# Patient Record
(demographics unavailable — no encounter records)

---

## 2024-11-13 NOTE — CONSULT LETTER
[Dear  ___] : Dear  [unfilled], [Courtesy Letter:] : I had the pleasure of seeing your patient, [unfilled], in my office today. [This report is provisional, pending the completion of the evaluation.  A final diagnosis and plan will follow.] : This report is provisional, pending the completion of the evaluation.  A final diagnosis and plan will follow. [Consult Closing:] : Thank you very much for allowing me to participate in the care of this patient.  If you have any questions, please do not hesitate to contact me. [Sincerely,] : Sincerely, [FreeTextEntry3] : Rachael Sawant MD  ___________________________________ Fellow, Division of Allergy and Immunology  [facility] [employer] at \[facility]\""/[address], [place] [address] Tel: [phone] Fax: [phone] Email: [email or web address]

## 2024-11-13 NOTE — PLAN
[FreeTextEntry1] : Patient passed oat challenge and tolerated 1/4 dry cup oatmeal (20g protein). She can incorporate oat into her diet regularly.   Mom also states that Dione has cough with illness, more so then her other children. No night time cough or trouble with exercise. Will continue to monitor for now. Can consider PRN albuterol in the future if needed.   Family to set up peanut challenge next.

## 2024-11-13 NOTE — HISTORY OF PRESENT ILLNESS
[] : The following medications are to be available during the challenge procedure: [Diphenhydramine] : Diphenhydramine, 1mg/kg PO (12.5 mg/5 cc) [Epinephrine 1:1000 IM] : Epinephrine 1:1000 IM, 0.01cc/kg (max dose 0.5 cc) [___] : HR: [unfilled]  [_______] : Time: [unfilled] [Clear] : Skin Findings: Clear [No] : Reaction: No [____] : IVB: [unfilled] [___] : Amount: [unfilled] [___% 1) Skin -  A) Erythematous rash - % area involved] : Erythematous Rash (IA): [unfilled] % area involved [0 Pruritus: 0  - absent] : Pruritus (IB): 0 - absent [0 Urticaria/Angioedema: 0 - Absent] : Urticaria/Angioedema (IC): 0  - Absent [0 Rash: 0 - Absent] : Rash (ID): 0 - Absent [0 Sneezing/Itchin - Absent] : Sneezing/Itching (IIA): 0 - Absent [0 Nasal congestion: 0 - Absent] : Nasal congestion (IIB): 0 - Absent [0 Rhinorrhea: 0 - Absent] : Rhinorrhea (IIC): 0 - Absent [0 Laryngeal: 0 - Absent] : Laryngeal (IID): 0 - Absent [0 Wheezin - Absent] : Wheezing (IIIA): 0 - Absent [0 Gastro-Subjective complaints: 0 - Absent] : Gastro-Subjective Complaints (KRISTEN): 0 - Absent [0 Gastro-Objective complaints: 0 - Absent] : Gastro-Objective Complaints (IVB): 0 - Absent [Antihistamine use in past 5 days] : No antihistamine use in past 5 days [Recent Illness] : no recent illness [Fever] : no fever [Asthma] : no asthma [Asthma well controlled?] : asthma well controlled: no [FreeTextEntry1] : oatmeal [FreeTextEntry2] : 20g [FreeTextEntry3] : O2 99% [FreeTextEntry4] : O2 99% [FreeTextEntry5] : O2 99% [FreeTextEntry6] : O2 99% [de-identified] : Challenge complete

## 2024-11-13 NOTE — CONSULT LETTER
[Dear  ___] : Dear  [unfilled], [Courtesy Letter:] : I had the pleasure of seeing your patient, [unfilled], in my office today. [This report is provisional, pending the completion of the evaluation.  A final diagnosis and plan will follow.] : This report is provisional, pending the completion of the evaluation.  A final diagnosis and plan will follow. [Consult Closing:] : Thank you very much for allowing me to participate in the care of this patient.  If you have any questions, please do not hesitate to contact me. [Sincerely,] : Sincerely, [FreeTextEntry3] : Rachael Sawant MD  ___________________________________ Fellow, Division of Allergy and Immunology  [facility] at [facility]/[address], [place] [address] Tel: [phone] Fax: [phone] Email: [email or web address]

## 2024-11-13 NOTE — HISTORY OF PRESENT ILLNESS
[] : The following medications are to be available during the challenge procedure: [Diphenhydramine] : Diphenhydramine, 1mg/kg PO (12.5 mg/5 cc) [Epinephrine 1:1000 IM] : Epinephrine 1:1000 IM, 0.01cc/kg (max dose 0.5 cc) [___] : HR: [unfilled]  [_______] : Time: [unfilled] [Clear] : Skin Findings: Clear [No] : Reaction: No [____] : IVB: [unfilled] [___] : Amount: [unfilled] [___% 1) Skin -  A) Erythematous rash - % area involved] : Erythematous Rash (IA): [unfilled] % area involved [0 Pruritus: 0  - absent] : Pruritus (IB): 0 - absent [0 Urticaria/Angioedema: 0 - Absent] : Urticaria/Angioedema (IC): 0  - Absent [0 Rash: 0 - Absent] : Rash (ID): 0 - Absent [0 Sneezing/Itchin - Absent] : Sneezing/Itching (IIA): 0 - Absent [0 Nasal congestion: 0 - Absent] : Nasal congestion (IIB): 0 - Absent [0 Rhinorrhea: 0 - Absent] : Rhinorrhea (IIC): 0 - Absent [0 Laryngeal: 0 - Absent] : Laryngeal (IID): 0 - Absent [0 Wheezin - Absent] : Wheezing (IIIA): 0 - Absent [0 Gastro-Subjective complaints: 0 - Absent] : Gastro-Subjective Complaints (KRISTEN): 0 - Absent [0 Gastro-Objective complaints: 0 - Absent] : Gastro-Objective Complaints (IVB): 0 - Absent [Antihistamine use in past 5 days] : No antihistamine use in past 5 days [Recent Illness] : no recent illness [Fever] : no fever [Asthma] : no asthma [Asthma well controlled?] : asthma well controlled: no [FreeTextEntry1] : oatmeal [FreeTextEntry2] : 20g [FreeTextEntry3] : O2 99% [FreeTextEntry4] : O2 99% [FreeTextEntry5] : O2 99% [FreeTextEntry6] : O2 99% [de-identified] : Challenge complete

## 2024-11-22 NOTE — CONSULT LETTER
[Dear  ___] : Dear  [unfilled], [Courtesy Letter:] : I had the pleasure of seeing your patient, [unfilled], in my office today. [Please see my note below.] : Please see my note below. [Consult Closing:] : Thank you very much for allowing me to participate in the care of this patient.  If you have any questions, please do not hesitate to contact me. [Sincerely,] : Sincerely, [FreeTextEntry3] : Rachael Sawant MD  ___________________________________ Fellow, Division of Allergy and Immunology  [facility] [employer] at [facility]/[address], [place] [address] Tel: [phone] Fax: [phone] Email: [email or web address]

## 2024-11-22 NOTE — HISTORY OF PRESENT ILLNESS
[Diphenhydramine] : Diphenhydramine, 1mg/kg PO (12.5 mg/5 cc) [Epinephrine 1:1000 IM] : Epinephrine 1:1000 IM, 0.01cc/kg (max dose 0.5 cc) [___] : HR: [unfilled]  [_______] : Time: [unfilled] [Clear] : Skin Findings: Clear [No] : Reaction: No [___] : Amount: [unfilled] [___% 1) Skin -  A) Erythematous rash - % area involved] : Erythematous Rash (IA): [unfilled] % area involved [0 Pruritus: 0  - absent] : Pruritus (IB): 0 - absent [0 Urticaria/Angioedema: 0 - Absent] : Urticaria/Angioedema (IC): 0  - Absent [0 Rash: 0 - Absent] : Rash (ID): 0 - Absent [0 Sneezing/Itchin - Absent] : Sneezing/Itching (IIA): 0 - Absent [0 Nasal congestion: 0 - Absent] : Nasal congestion (IIB): 0 - Absent [0 Rhinorrhea: 0 - Absent] : Rhinorrhea (IIC): 0 - Absent [0 Laryngeal: 0 - Absent] : Laryngeal (IID): 0 - Absent [0 Wheezin - Absent] : Wheezing (IIIA): 0 - Absent [0 Gastro-Subjective complaints: 0 - Absent] : Gastro-Subjective Complaints (KRISTEN): 0 - Absent [0 Gastro-Objective complaints: 0 - Absent] : Gastro-Objective Complaints (IVB): 0 - Absent [Antihistamine use in past 5 days] : No antihistamine use in past 5 days [Recent Illness] : no recent illness [Fever] : no fever [Asthma] : no asthma [Asthma well controlled?] : asthma well controlled: no [FreeTextEntry1] : peanut powder [FreeTextEntry2] : 12g [FreeTextEntry4] : O2 99% [FreeTextEntry5] : O2 98% [FreeTextEntry6] : O2 99%

## 2024-11-22 NOTE — PLAN
[FreeTextEntry1] : Patient has passed PEANUT and OAT challenges thus far. Plan for WALNUT/PECAN challenge next.

## 2024-11-22 NOTE — CONSULT LETTER
[Dear  ___] : Dear  [unfilled], [Courtesy Letter:] : I had the pleasure of seeing your patient, [unfilled], in my office today. [Please see my note below.] : Please see my note below. [Consult Closing:] : Thank you very much for allowing me to participate in the care of this patient.  If you have any questions, please do not hesitate to contact me. [Sincerely,] : Sincerely, [FreeTextEntry3] : Rachael Sawant MD  ___________________________________ Fellow, Division of Allergy and Immunology  [facility] at [facility]/[address], [place] [address] Tel: [phone] Fax: [phone] Email: [email or web address]

## 2024-11-22 NOTE — END OF VISIT
[] : Fellow [FreeTextEntry3] : Patient discharged one hour after last ingestion, no signs or symptoms of IgE mediated reactivity. Instructed to observe at home for the next several hours and call/seek emergency care if needed.

## 2024-12-18 NOTE — HISTORY OF PRESENT ILLNESS
[Consent obtained and signed form scanned in to chart] : Consent obtained and signed form scanned in to chart [] : The following medications are to be available during the challenge procedure: [Diphenhydramine] : Diphenhydramine, 1-2mg/kg IM (max dose 50mg), (50mg/1 cc) [Epinephrine 1:1000 IM] : Epinephrine 1:1000 IM, 0.01cc/kg (max dose 0.5 cc) [___] : HR: [unfilled]  [_______] : Time: [unfilled] [Clear] : Skin Findings: Clear [No] : Reaction: No [___] : Amount: [unfilled] [0 Pruritus: 0  - absent] : Pruritus (IB): 0 - absent [0 Urticaria/Angioedema: 0 - Absent] : Urticaria/Angioedema (IC): 0  - Absent [0 Rash: 0 - Absent] : Rash (ID): 0 - Absent [0 Sneezing/Itchin - Absent] : Sneezing/Itching (IIA): 0 - Absent [0 Nasal congestion: 0 - Absent] : Nasal congestion (IIB): 0 - Absent [0 Rhinorrhea: 0 - Absent] : Rhinorrhea (IIC): 0 - Absent [0 Laryngeal: 0 - Absent] : Laryngeal (IID): 0 - Absent [0 Wheezin - Absent] : Wheezing (IIIA): 0 - Absent [0 Gastro-Subjective complaints: 0 - Absent] : Gastro-Subjective Complaints (KRISTEN): 0 - Absent [0 Gastro-Objective complaints: 0 - Absent] : Gastro-Objective Complaints (IVB): 0 - Absent [Antihistamine use in past 5 days] : No antihistamine use in past 5 days [Recent Illness] : no recent illness [Fever] : no fever [Asthma] : no asthma [Asthma well controlled?] : asthma well controlled: no [FreeTextEntry1] : walnut [FreeTextEntry2] : 16g [FreeTextEntry3] : O2 97% [FreeTextEntry4] : O2 95% [FreeTextEntry5] : O2 99% [FreeTextEntry6] : O2 99%

## 2024-12-18 NOTE — PROCEDURE
[Patient ingested ___ amount of allergen] : Patient ingested [unfilled] amount of allergen [Pass] : Challenge: Pass [FreeTextEntry1] : Patient has passed WALNUT, PEANUT and OAT challenges thus far. Can incorporate Pecan into diet at home.   Return in one month to discuss next steps- OIT vs other food challenges (will be higher risk).

## 2024-12-18 NOTE — REVIEW OF SYSTEMS
[Atopic Dermatitis] : atopic dermatitis [Nl] : Gastrointestinal [de-identified] : Eczema on cheek [de-identified] : see HPI

## 2024-12-18 NOTE — REVIEW OF SYSTEMS
[Atopic Dermatitis] : atopic dermatitis [Nl] : Gastrointestinal [de-identified] : Eczema on cheek [de-identified] : see HPI

## 2024-12-18 NOTE — PHYSICAL EXAM
[Alert] : alert [Well Nourished] : well nourished [No Acute Distress] : no acute distress [Well Developed] : well developed [Normal Pupil & Iris Size/Symmetry] : normal pupil and iris size and symmetry [No Discharge] : no discharge [Sclera Not Icteric] : sclera not icteric [Normal Lips/Tongue] : the lips and tongue were normal [Normal Outer Ear/Nose] : the ears and nose were normal in appearance [Normal Tonsils] : normal tonsils [No Thrush] : no thrush [Normal Rate and Effort] : normal respiratory rhythm and effort [No Crackles] : no crackles [No Retractions] : no retractions [Bilateral Audible Breath Sounds] : bilateral audible breath sounds [Normal Rate] : heart rate was normal  [Normal S1, S2] : normal S1 and S2 [Regular Rhythm] : with a regular rhythm [Soft] : abdomen soft [Not Distended] : not distended [Patches] : ~M patches present [Clear Rhinorrhea] : no clear rhinorrhea was seen [Wheezing] : no wheezing was heard

## 2025-01-08 NOTE — REASON FOR VISIT
[Routine Follow-Up] : a routine follow-up visit for [Parents] : parents [Medical Records] : medical records [To Food] : allergy to food [Eczema] : eczema

## 2025-01-10 NOTE — PHYSICAL EXAM
[Alert] : alert [Well Nourished] : well nourished [Healthy Appearance] : healthy appearance [No Acute Distress] : no acute distress [Well Developed] : well developed [Normal Pupil & Iris Size/Symmetry] : normal pupil and iris size and symmetry [No Discharge] : no discharge [No Photophobia] : no photophobia [Sclera Not Icteric] : sclera not icteric [Normal Lips/Tongue] : the lips and tongue were normal [Normal Outer Ear/Nose] : the ears and nose were normal in appearance [Normal Tonsils] : normal tonsils [No Thrush] : no thrush [Pale mucosa] : pale mucosa [Supple] : the neck was supple [Normal Rate and Effort] : normal respiratory rhythm and effort [No Crackles] : no crackles [No Retractions] : no retractions [Bilateral Audible Breath Sounds] : bilateral audible breath sounds [Normal Rate] : heart rate was normal  [Normal S1, S2] : normal S1 and S2 [No murmur] : no murmur [Regular Rhythm] : with a regular rhythm [Soft] : abdomen soft [Not Tender] : non-tender [Not Distended] : not distended [Skin Intact] : skin intact  [No Rash] : no rash [No Skin Lesions] : no skin lesions [Normal Mood] : mood was normal [Normal Affect] : affect was normal [Alert, Awake, Oriented as Age-Appropriate] : alert, awake, oriented as age appropriate [Wheezing] : no wheezing was heard [de-identified] : Stye on lower eyelid of left eye

## 2025-01-10 NOTE — REVIEW OF SYSTEMS
[Nl] : Gastrointestinal [FreeTextEntry4] : see HPI [de-identified] : see HPI [de-identified] : see HPI

## 2025-01-10 NOTE — HISTORY OF PRESENT ILLNESS
[de-identified] : 3 year old female with food allergies (milk, egg, TN (not walnut)) and history of eczema presents for follow up visit.  Last follow up visit Aug. 21, 2024.   Interval Hx: Since her last follow up visit, she has passed challenges to walnut, oat, and peanut.  Eating walnut 2-3x/week, oatmeal 2-3x/week, peanut 2-3x/week. Family has not yet had a chance to introduce pecan at home but they are planning too.  Eczema is well controlled with Aquaphor daily. Developed small stye on left eye yesterday morning. Older sister also had one. Mom has been doing warm compresses.   Previous Hx:  AVOIDS: - MILK: Hx of reaction at 3 yo- rash, vomiting, wheeze. + casein IgE (class 3), + CM IgE (class 3), casein SPT 10x3, CM 10x5 -EGG: Hx of reaction at 6 mo- rash, vomiting, cough. + ovalbumin (class 3) and ovmucoid (class 4), egg white total class 4, SPT 15x15  --TREE NUTS: has never tried. Elevated immunocaps to: [place] (class 3), neg SPT Cashew (+ components class 2), SPT 10x2 Pistachio class 3, SPT 12x12 [place] class 0/1, neg components Pecan class 0, neg components, SPT 2x2 Radha nut (+ components cor A9 class 3), SPT 5x5 [place] nut class 2, neg components, SPT 10x3  OAT - oat meal around 1 year old- had rash but family does not really remember, currently avoids. Slightly elevated immunocaps (class 1)  PEANUT- has not tried. Slightly elevated immunocaps 0.78 (class 2, neg components)

## 2025-01-10 NOTE — PHYSICAL EXAM
[Alert] : alert [Well Nourished] : well nourished [Healthy Appearance] : healthy appearance [No Acute Distress] : no acute distress [Well Developed] : well developed [Normal Pupil & Iris Size/Symmetry] : normal pupil and iris size and symmetry [No Discharge] : no discharge [No Photophobia] : no photophobia [Sclera Not Icteric] : sclera not icteric [Normal Lips/Tongue] : the lips and tongue were normal [Normal Outer Ear/Nose] : the ears and nose were normal in appearance [Normal Tonsils] : normal tonsils [No Thrush] : no thrush [Pale mucosa] : pale mucosa [Supple] : the neck was supple [Normal Rate and Effort] : normal respiratory rhythm and effort [No Crackles] : no crackles [No Retractions] : no retractions [Bilateral Audible Breath Sounds] : bilateral audible breath sounds [Normal Rate] : heart rate was normal  [Normal S1, S2] : normal S1 and S2 [No murmur] : no murmur [Regular Rhythm] : with a regular rhythm [Soft] : abdomen soft [Not Tender] : non-tender [Not Distended] : not distended [Skin Intact] : skin intact  [No Rash] : no rash [No Skin Lesions] : no skin lesions [Normal Mood] : mood was normal [Normal Affect] : affect was normal [Alert, Awake, Oriented as Age-Appropriate] : alert, awake, oriented as age appropriate [Wheezing] : no wheezing was heard [de-identified] : Stye on lower eyelid of left eye

## 2025-01-10 NOTE — REVIEW OF SYSTEMS
[Nl] : Gastrointestinal [FreeTextEntry4] : see HPI [de-identified] : see HPI [de-identified] : see HPI

## 2025-01-10 NOTE — HISTORY OF PRESENT ILLNESS
[de-identified] : 3 year old female with food allergies (milk, egg, TN (not walnut)) and history of eczema presents for follow up visit.  Last follow up visit Aug. 21, 2024.   Interval Hx: Since her last follow up visit, she has passed challenges to walnut, oat, and peanut.  Eating walnut 2-3x/week, oatmeal 2-3x/week, peanut 2-3x/week. Family has not yet had a chance to introduce pecan at home but they are planning too.  Eczema is well controlled with Aquaphor daily. Developed small stye on left eye yesterday morning. Older sister also had one. Mom has been doing warm compresses.   Previous Hx:  AVOIDS: - MILK: Hx of reaction at 1 yo- rash, vomiting, wheeze. + casein IgE (class 3), + CM IgE (class 3), casein SPT 10x3, CM 10x5 -EGG: Hx of reaction at 6 mo- rash, vomiting, cough. + ovalbumin (class 3) and ovmucoid (class 4), egg white total class 4, SPT 15x15  --TREE NUTS: has never tried. Elevated immunocaps to: [place] (class 3), neg SPT Cashew (+ components class 2), SPT 10x2 [place] class 3, SPT 12x12 [place] class 0/1, neg components Pecan class 0, neg components, SPT 2x2 Radha nut (+ components cor A9 class 3), SPT 5x5 [place] nut class 2, neg components, SPT 10x3  OAT - oat meal around 1 year old- had rash but family does not really remember, currently avoids. Slightly elevated immunocaps (class 1)  PEANUT- has not tried. Slightly elevated immunocaps 0.78 (class 2, neg components)

## 2025-02-26 NOTE — PHYSICAL EXAM
[Alert] : alert [Well Nourished] : well nourished [Healthy Appearance] : healthy appearance [No Acute Distress] : no acute distress [Well Developed] : well developed [Normal Pupil & Iris Size/Symmetry] : normal pupil and iris size and symmetry [No Discharge] : no discharge [No Photophobia] : no photophobia [Sclera Not Icteric] : sclera not icteric [Normal TMs] : both tympanic membranes were normal [Normal Nasal Mucosa] : the nasal mucosa was normal [Normal Lips/Tongue] : the lips and tongue were normal [Normal Outer Ear/Nose] : the ears and nose were normal in appearance [Normal Tonsils] : normal tonsils [No Thrush] : no thrush [Supple] : the neck was supple [Normal Rate and Effort] : normal respiratory rhythm and effort [No Crackles] : no crackles [No Retractions] : no retractions [Bilateral Audible Breath Sounds] : bilateral audible breath sounds [Normal Rate] : heart rate was normal  [Normal S1, S2] : normal S1 and S2 [No murmur] : no murmur [Regular Rhythm] : with a regular rhythm [Soft] : abdomen soft [Not Tender] : non-tender [Not Distended] : not distended [No HSM] : no hepato-splenomegaly [Normal Cervical Lymph Nodes] : cervical [Patches] : ~M patches present [Xerosis] : xerosis [Excoriated] : excoriated [Lichenifcation] : lichenifcation [No clubbing] : no clubbing [No Edema] : no edema [No Cyanosis] : no cyanosis [Normal Mood] : mood was normal [Normal Affect] : affect was normal [Alert, Awake, Oriented as Age-Appropriate] : alert, awake, oriented as age appropriate

## 2025-02-26 NOTE — REASON FOR VISIT
[Routine Follow-Up] : a routine follow-up visit for [To Food] : allergy to food [Eczema] : eczema [Parents] : parents [Medical Records] : medical records

## 2025-03-03 NOTE — HISTORY OF PRESENT ILLNESS
[] : The following medications are to be available during the challenge procedure: [Diphenhydramine] : Diphenhydramine, 1-2mg/kg IM (max dose 50mg), (50mg/1 cc) [Epinephrine 1:1000 IM] : Epinephrine 1:1000 IM, 0.01cc/kg (max dose 0.5 cc) [Albuterol MDI] : Albuterol MDI, 2 - 4 puffs [Albuterol nebulized] : Albuterol nebulized, 0.083% [de-identified] : 3 year old female with food allergies (milk, egg, TN (not walnut)) and history of eczema presents for follow up visit.  Initially presented for almond challenge however skin testing placed prior to challenge showed a wheal of 7x3 ( no wheal in July 2024).  Did not proceed with challenge.   Also currently with eczema flare. Ran out of mometasone so has not been using.   Had flu 2 weeks ago [Antihistamine use in past 5 days] : No antihistamine use in past 5 days [Recent Illness] : no recent illness [Fever] : no fever [Asthma] : no asthma [Asthma well controlled?] : asthma well controlled: no [FreeTextEntry1] : almond butter [FreeTextEntry2] : 2 tablespoons

## 2025-03-03 NOTE — HISTORY OF PRESENT ILLNESS
[] : The following medications are to be available during the challenge procedure: [Diphenhydramine] : Diphenhydramine, 1-2mg/kg IM (max dose 50mg), (50mg/1 cc) [Epinephrine 1:1000 IM] : Epinephrine 1:1000 IM, 0.01cc/kg (max dose 0.5 cc) [Albuterol MDI] : Albuterol MDI, 2 - 4 puffs [Albuterol nebulized] : Albuterol nebulized, 0.083% [de-identified] : 3 year old female with food allergies (milk, egg, TN (not walnut)) and history of eczema presents for follow up visit.  Initially presented for almond challenge however skin testing placed prior to challenge showed a wheal of 7x3 ( no wheal in July 2024).  Did not proceed with challenge.   Also currently with eczema flare. Ran out of mometasone so has not been using.   Had flu 2 weeks ago [Antihistamine use in past 5 days] : No antihistamine use in past 5 days [Recent Illness] : no recent illness [Fever] : no fever [Asthma] : no asthma [Asthma well controlled?] : asthma well controlled: no [FreeTextEntry1] : almond butter [FreeTextEntry2] : 2 tablespoons

## 2025-03-03 NOTE — HISTORY OF PRESENT ILLNESS
[] : The following medications are to be available during the challenge procedure: [Diphenhydramine] : Diphenhydramine, 1-2mg/kg IM (max dose 50mg), (50mg/1 cc) [Epinephrine 1:1000 IM] : Epinephrine 1:1000 IM, 0.01cc/kg (max dose 0.5 cc) [Albuterol MDI] : Albuterol MDI, 2 - 4 puffs [Albuterol nebulized] : Albuterol nebulized, 0.083% [de-identified] : 3 year old female with food allergies (milk, egg, TN (not walnut)) and history of eczema presents for follow up visit.  Initially presented for almond challenge however skin testing placed prior to challenge showed a wheal of 7x3 ( no wheal in July 2024).  Did not proceed with challenge.   Also currently with eczema flare. Ran out of mometasone so has not been using.   Had flu 2 weeks ago [Antihistamine use in past 5 days] : No antihistamine use in past 5 days [Recent Illness] : no recent illness [Fever] : no fever [Asthma] : no asthma [Asthma well controlled?] : asthma well controlled: no [FreeTextEntry1] : almond butter [FreeTextEntry2] : 2 tablespoons

## 2025-03-14 NOTE — PROCEDURE
[Patient ingested ___ amount of allergen] : Patient ingested [unfilled] amount of allergen [Pass] : Challenge: Pass [FreeTextEntry1] : Patient ingested 10g of almond powder, 5g of peanut protein.

## 2025-03-14 NOTE — PLAN
[FreeTextEntry1] : Patient has passed WALNUT, PEANUT, OAT, ALMOND challenges thus far. Can incorporate Pecan into diet at home.  Make follow up appointment to discuss next steps and other possible food challenges.  Consider cashew challenge as next food challenge

## 2025-03-14 NOTE — HISTORY OF PRESENT ILLNESS
[Consent obtained and signed form scanned in to chart] : Consent obtained and signed form scanned in to chart [] : The following medications are to be available during the challenge procedure: [Diphenhydramine] : Diphenhydramine, 1-2mg/kg IM (max dose 50mg), (50mg/1 cc) [___ mg] : Dose: [unfilled] mg [Epinephrine 1:1000 IM] : Epinephrine 1:1000 IM, 0.01cc/kg (max dose 0.5 cc) [___ cc] : Volume: [unfilled] cc [___] : HR: [unfilled]  [_______] : Time: [unfilled] [Clear] : Skin Findings: Clear [No] : Reaction: No [___] : Amount: [unfilled] [0 Pruritus: 0  - absent] : Pruritus (IB): 0 - absent [0 Urticaria/Angioedema: 0 - Absent] : Urticaria/Angioedema (IC): 0  - Absent [0 Rash: 0 - Absent] : Rash (ID): 0 - Absent [0 Sneezing/Itchin - Absent] : Sneezing/Itching (IIA): 0 - Absent [0 Nasal congestion: 0 - Absent] : Nasal congestion (IIB): 0 - Absent [0 Rhinorrhea: 0 - Absent] : Rhinorrhea (IIC): 0 - Absent [0 Laryngeal: 0 - Absent] : Laryngeal (IID): 0 - Absent [0 Wheezin - Absent] : Wheezing (IIIA): 0 - Absent [0 Gastro-Subjective complaints: 0 - Absent] : Gastro-Subjective Complaints (KRISTEN): 0 - Absent [0 Gastro-Objective complaints: 0 - Absent] : Gastro-Objective Complaints (IVB): 0 - Absent [Antihistamine use in past 5 days] : No antihistamine use in past 5 days [Recent Illness] : no recent illness [Fever] : no fever [Asthma] : no asthma [FreeTextEntry1] : almond butter powder [FreeTextEntry2] : 10g [FreeTextEntry3] : O2 98% [FreeTextEntry4] : O2 97% [FreeTextEntry5] : O2 98%

## 2025-04-09 NOTE — REVIEW OF SYSTEMS
[Nl] : Cardiovascular [FreeTextEntry4] : see hpi [FreeTextEntry6] : see hpi [de-identified] : see hpi [de-identified] : see hpi

## 2025-04-09 NOTE — REVIEW OF SYSTEMS
[Nl] : Cardiovascular [FreeTextEntry4] : see hpi [FreeTextEntry6] : see hpi [de-identified] : see hpi [de-identified] : see hpi

## 2025-04-09 NOTE — PHYSICAL EXAM
[Alert] : alert [Well Nourished] : well nourished [Healthy Appearance] : healthy appearance [No Acute Distress] : no acute distress [Well Developed] : well developed [Normal Pupil & Iris Size/Symmetry] : normal pupil and iris size and symmetry [No Discharge] : no discharge [No Photophobia] : no photophobia [Sclera Not Icteric] : sclera not icteric [Normal TMs] : both tympanic membranes were normal [Normal Nasal Mucosa] : the nasal mucosa was normal [Normal Lips/Tongue] : the lips and tongue were normal [Normal Outer Ear/Nose] : the ears and nose were normal in appearance [Normal Tonsils] : normal tonsils [No Thrush] : no thrush [Supple] : the neck was supple [Normal Rate and Effort] : normal respiratory rhythm and effort [No Crackles] : no crackles [No Retractions] : no retractions [Bilateral Audible Breath Sounds] : bilateral audible breath sounds [Normal Rate] : heart rate was normal  [Normal S1, S2] : normal S1 and S2 [No murmur] : no murmur [Regular Rhythm] : with a regular rhythm [Soft] : abdomen soft [Not Tender] : non-tender [Not Distended] : not distended [No HSM] : no hepato-splenomegaly [Normal Cervical Lymph Nodes] : cervical [Patches] : ~M patches present [Erythema] : erythema [No clubbing] : no clubbing [No Edema] : no edema [No Cyanosis] : no cyanosis [Normal Mood] : mood was normal [Normal Affect] : affect was normal [Alert, Awake, Oriented as Age-Appropriate] : alert, awake, oriented as age appropriate [Wheezing] : no wheezing was heard

## 2025-04-09 NOTE — HISTORY OF PRESENT ILLNESS
[de-identified] : 3 year old female with multiple food allergies (milk, egg, TN (not walnut)) and history of eczema initially presenting for cashew challenge, which was deferred due to reaction earlier this morning.   Initially presented for cashew challenge but had an allergic reaction this morning to unclear trigger.  This morning mom had washed cashew nuts and blended them in  to make a powder for almond challenge today. She then washed her hands and went to get Dione ready. She washed Dione's face with her hands and water and shortly thereafter, Dione was itching all over with hives on her face and all over her body. Dad said she also started mouth breathing (usually breaths through her nose). She was given Benadryl and symptoms improved. Mom reports Dione was in the kitchen when she was blending the cashew but had no exposure to it (did not touch , counter). Dione was wearing clothes at the time mom was preparing the cashew (skin not exposed). Mom did not wipe her hands on on a paper towel.     Previous Hx:  AVOIDS: - MILK: Hx of reaction at 1 yo- rash, vomiting, wheeze. + casein IgE (class 3), + CM IgE (class 3), casein SPT 10x3, CM 10x5 -EGG: Hx of reaction at 6 mo- rash, vomiting, cough. + ovalbumin (class 3) and ovmucoid (class 4), egg white total class 4, SPT 15x15  --TREE NUTS: has never tried. Elevated immunocaps to: [place] (class 3), neg SPT- passed challenge 3/14/25 Cashew (+ components class 2), SPT 10x2 [place] class 3, SPT 12x12 [place] class 0/1, neg components- passed challenge 12/13/24 Pecan class 0, neg components, SPT 2x2 Radha nut (+ components cor A9 class 3), SPT 5x5 Brazil nut class 2, neg components, SPT 10x3  OAT - oat meal around 1 year old- had rash but family does not really remember, currently avoids. Slightly elevated immunocaps (class 1). Passed challenge 11/16/24  PEANUT- has not tried. Slightly elevated immunocaps 0.78 (class 2, neg components). Passed challenge 11/20/24

## 2025-04-09 NOTE — HISTORY OF PRESENT ILLNESS
[de-identified] : 3 year old female with multiple food allergies (milk, egg, TN (not walnut)) and history of eczema initially presenting for cashew challenge, which was deferred due to reaction earlier this morning.   Initially presented for cashew challenge but had an allergic reaction this morning to unclear trigger.  This morning mom had washed cashew nuts and blended them in  to make a powder for almond challenge today. She then washed her hands and went to get Dione ready. She washed Dione's face with her hands and water and shortly thereafter, Dione was itching all over with hives on her face and all over her body. Dad said she also started mouth breathing (usually breaths through her nose). She was given Benadryl and symptoms improved. Mom reports Dione was in the kitchen when she was blending the cashew but had no exposure to it (did not touch , counter). Dione was wearing clothes at the time mom was preparing the cashew (skin not exposed). Mom did not wipe her hands on on a paper towel.     Previous Hx:  AVOIDS: - MILK: Hx of reaction at 1 yo- rash, vomiting, wheeze. + casein IgE (class 3), + CM IgE (class 3), casein SPT 10x3, CM 10x5 -EGG: Hx of reaction at 6 mo- rash, vomiting, cough. + ovalbumin (class 3) and ovmucoid (class 4), egg white total class 4, SPT 15x15  --TREE NUTS: has never tried. Elevated immunocaps to: [place] (class 3), neg SPT- passed challenge 3/14/25 Cashew (+ components class 2), SPT 10x2 [place]istach[place] class 3, SPT 12x12 [place] class 0/1, neg components- passed challenge 12/13/24 Pecan class 0, neg components, SPT 2x2 [place] nut (+ components cor A9 class 3), SPT 5x5 Brazil nut class 2, neg components, SPT 10x3  OAT - oat meal around 1 year old- had rash but family does not really remember, currently avoids. Slightly elevated immunocaps (class 1). Passed challenge 11/16/24  PEANUT- has not tried. Slightly elevated immunocaps 0.78 (class 2, neg components). Passed challenge 11/20/24

## 2025-05-23 NOTE — PHYSICAL EXAM

## 2025-05-24 NOTE — PROCEDURE
[Patient ingested ___ amount of allergen] : Patient ingested [unfilled] amount of allergen [Fail] : Challenge: Fail [FreeTextEntry1] : SPT done prior to start of challenge showed cashew wheal of 19x15. Explained to parents that challenge would be high risk but they would like to proceed anyway since she never ingested a cashew.   Attempted micro challenge with 1.5 cashews (~1.76 total weight). Patient had two doses (totaling 0.19g) before she became inconsolable, nausea + emesis x1, scratching her throat. VSS. No hives present, no wheezing or trouble breathing. PO Benadryl given with minimal improvement. 0.15g Epi (LOT [id number], Exp Oct. 2025, [id number]) administered IM in R outer thigh with subsequent improvement in symptoms.  Patient was monitored for 2 hours following epinephrine administration and discharged home.  Parents have Epipen and feel comfortable using it. They also have benadryl and Zyrtec.  Dione should AVOID MILK, EGG, CASHEW, PISTACHIO, WILTON NUT, BRAZIL NUT Xolair would be a good option for her- parents are avoiding these food but she has had 2 reactions in the last couple of months Information given to the parents  They will return in one month for follow up visit and we will discuss this further

## 2025-05-24 NOTE — HISTORY OF PRESENT ILLNESS
[Consent obtained and signed form scanned in to chart] : Consent obtained and signed form scanned in to chart [] : The following medications are to be available during the challenge procedure: [Diphenhydramine] : Diphenhydramine, 1-2mg/kg IM (max dose 50mg), (50mg/1 cc) [___ cc] : Volume: [unfilled] cc [Clear] : Skin Findings: Clear [No] : Reaction: No [_______] : Time: [unfilled] [Yes: ___] : Reaction: Yes - [unfilled] [___] : HR: [unfilled]  [___% 1) Skin -  A) Erythematous rash - % area involved] : Erythematous Rash (IA): [unfilled] % area involved [0 Pruritus: 0  - absent] : Pruritus (IB): 0 - absent [0 Rhinorrhea: 0 - Absent] : Rhinorrhea (IIC): 0 - Absent [0 Wheezin - Absent] : Wheezing (IIIA): 0 - Absent [0 Gastro-Subjective complaints: 0 - Absent] : Gastro-Subjective Complaints (KRISTEN): 0 - Absent [0 Gastro-Objective complaints: 0 - Absent] : Gastro-Objective Complaints (IVB): 0 - Absent [___] : Time: [unfilled] [1 Pruritus: 1 - mild-occasional scratching] : Pruritus (IB): 1 -  mild [0 Urticaria/Angioedema: 0 - Absent] : Urticaria/Angioedema (IC): 0  - Absent [0 Rash: 0 - Absent] : Rash (ID): 0 - Absent [0 Sneezing/Itchin - Absent] : Sneezing/Itching (IIA): 0 - Absent [0 Nasal congestion: 0 - Absent] : Nasal congestion (IIB): 0 - Absent [1 Rhinorrhea: 1 - mild; occasional sniffling] : Rhinorrhea (IIC): 1 - mild [0 Laryngeal: 0 - Absent] : Laryngeal (IID): 0 - Absent [4 Wheezin - moderate dysspnea, inspiratory and expiratory wheezing] : Wheezing (IIIA): 4 - moderate [1 Gastro-Subjective complaints: 1 - mild, frequent c/o nausea or pain, decreased activity] : Gastro-Subjective Complaints (KRISTEN): 1 - mild [1 Gastro-Objective complaints: 1 - mild, 1 episode of emesis or diarrhea] : Gastro-Objective Complaints (IVB): 1 - mild [Antihistamine use in past 5 days] : No antihistamine use in past 5 days [Recent Illness] : no recent illness [Fever] : no fever [Asthma] : no asthma [Asthma well controlled?] : asthma well controlled: no [FreeTextEntry1] : cashew [FreeTextEntry2] : 1.5 cashews  [FreeTextEntry3] : O2=99% [FreeTextEntry4] : O2= 100%

## 2025-05-24 NOTE — CONSULT LETTER
[Dear  ___] : Dear  [unfilled], [Consult Letter:] : I had the pleasure of evaluating your patient, [unfilled]. [Please see my note below.] : Please see my note below. [Consult Closing:] : Thank you very much for allowing me to participate in the care of this patient.  If you have any questions, please do not hesitate to contact me. [Sincerely,] : Sincerely, [FreeTextEntry3] : Rachael Sawant MD  ___________________________________ Fellow, Division of Allergy and Immunology  [facility] [employer] [facility]/[address], [place] [address] Tel: [phone] Fax: [phone] Email: [email or web address]

## 2025-06-25 NOTE — PHYSICAL EXAM

## 2025-06-25 NOTE — REASON FOR VISIT
[Routine Follow-Up] : a routine follow-up visit for [Anaphylaxis] : anaphylaxis [Allergy Evaluation/ Skin Testing] : allergy evaluation and or skin testing [Congestion] : congestion [Cough] : cough [Parents] : parents

## 2025-06-26 NOTE — REVIEW OF SYSTEMS
[FreeTextEntry4] : see hpi [FreeTextEntry6] : see hpi [de-identified] : see hpi [de-identified] : see hpi

## 2025-06-26 NOTE — HISTORY OF PRESENT ILLNESS
[de-identified] :  3 year old female with multiple food allergies (milk, egg, pecan, cashew, pistachio, suha nut, brazil nut) and history of eczema initially presenting for follow up visit. Last visit 5/23/25- failed cashew challenge   No reactions since last visit with cashew  Eats walnut and peanut- avoiding all other nuts   Avoiding all forms of milk, egg, all tree nuts except walnut  2-3 months ago had reaction to egg- touched egg and had hives all over   Has not introduced pecan yet   Passed challenges to walnut, almond, oat, peanut. Is eating these food regularly   Not interested in starting Xolair at this time- mom is concerned about cancer risk. They would like to keep discussing the option at subsequent visits though.   Dry cough last three weeks Every day  Always during the day  No exercise intolerance  Environmental immunocaps + dog  Sometimes coughs at night when she gets sweaty on her head and neck. Mom elevates her and dries her sweat and cough improves +nasal congestion Environmental immunocaps done in the past only +dog  Has hx of eczema but well controlled recently   Previous Hx:  AVOIDS: - MILK: Hx of reaction at 1 yo- rash, vomiting, wheeze. + casein IgE (class 3), + CM IgE (class 3), casein SPT 10x3, CM 10x5 -EGG: Hx of reaction at 6 mo- rash, vomiting, cough. + ovalbumin (class 3) and ovmucoid (class 4), egg white total class 4, SPT 15x15  --TREE NUTS: has never tried. Elevated immunocaps to: [place] (class 3), neg SPT- passed challenge 3/14/25 Cashew (+ components class 2), SPT 10x2 Pistachio class 3, SPT 12x12 [place] class 0/1, neg components- passed challenge 12/13/24 Pecan class 0, neg components, SPT 2x2 Suha nut (+ components cor A9 class 3), SPT 5x5 [place] nut class 2, neg components, SPT 10x3  OAT - oat meal around 1 year old- had rash but family does not really remember, currently avoids. Slightly elevated immunocaps (class 1). Passed challenge 11/16/24  PEANUT- has not tried. Slightly elevated immunocaps 0.78 (class 2, neg components). Passed challenge 11/20/24.